# Patient Record
Sex: FEMALE | Race: WHITE
[De-identification: names, ages, dates, MRNs, and addresses within clinical notes are randomized per-mention and may not be internally consistent; named-entity substitution may affect disease eponyms.]

---

## 2017-08-03 ENCOUNTER — HISTORIC RESULTS (OUTPATIENT)
Dept: ADMINISTRATIVE | Age: 27
End: 2017-08-03

## 2018-11-06 ENCOUNTER — HOSPITAL ENCOUNTER (EMERGENCY)
Facility: CLINIC | Age: 28
Discharge: HOME | End: 2018-11-07
Attending: FAMILY MEDICINE | Admitting: FAMILY MEDICINE
Payer: COMMERCIAL

## 2018-11-06 DIAGNOSIS — R00.2 PALPITATIONS: ICD-10-CM

## 2018-11-06 PROCEDURE — 25000128 H RX IP 250 OP 636: Performed by: FAMILY MEDICINE

## 2018-11-06 PROCEDURE — 84484 ASSAY OF TROPONIN QUANT: CPT | Performed by: FAMILY MEDICINE

## 2018-11-06 PROCEDURE — 85025 COMPLETE CBC W/AUTO DIFF WBC: CPT | Performed by: FAMILY MEDICINE

## 2018-11-06 PROCEDURE — 96360 HYDRATION IV INFUSION INIT: CPT | Performed by: FAMILY MEDICINE

## 2018-11-06 PROCEDURE — 93005 ELECTROCARDIOGRAM TRACING: CPT | Performed by: FAMILY MEDICINE

## 2018-11-06 PROCEDURE — 99284 EMERGENCY DEPT VISIT MOD MDM: CPT | Mod: 25 | Performed by: FAMILY MEDICINE

## 2018-11-06 PROCEDURE — 80053 COMPREHEN METABOLIC PANEL: CPT | Performed by: FAMILY MEDICINE

## 2018-11-06 PROCEDURE — 84703 CHORIONIC GONADOTROPIN ASSAY: CPT | Performed by: FAMILY MEDICINE

## 2018-11-06 PROCEDURE — 93010 ELECTROCARDIOGRAM REPORT: CPT | Mod: Z6 | Performed by: FAMILY MEDICINE

## 2018-11-06 PROCEDURE — 84443 ASSAY THYROID STIM HORMONE: CPT | Performed by: FAMILY MEDICINE

## 2018-11-06 PROCEDURE — 84439 ASSAY OF FREE THYROXINE: CPT | Performed by: FAMILY MEDICINE

## 2018-11-06 RX ORDER — SODIUM CHLORIDE, SODIUM LACTATE, POTASSIUM CHLORIDE, CALCIUM CHLORIDE 600; 310; 30; 20 MG/100ML; MG/100ML; MG/100ML; MG/100ML
1000 INJECTION, SOLUTION INTRAVENOUS CONTINUOUS
Status: DISCONTINUED | OUTPATIENT
Start: 2018-11-06 | End: 2018-11-07 | Stop reason: HOSPADM

## 2018-11-06 RX ADMIN — SODIUM CHLORIDE, POTASSIUM CHLORIDE, SODIUM LACTATE AND CALCIUM CHLORIDE 1000 ML: 600; 310; 30; 20 INJECTION, SOLUTION INTRAVENOUS at 23:54

## 2018-11-06 NOTE — ED AVS SNAPSHOT
[facility] Emergency Department    [address] [address]    Phone:  [phone]    Fax:  [phone]                                       Paulina Villatoro   MRN: [id number]    Department:  [facility] Emergency Department   Date of Visit:  11/6/2018           After Visit Summary Signature Page     I have received my discharge instructions, and my questions have been answered. I have discussed any challenges I see with this plan with the nurse or doctor.    ..........................................................................................................................................  Patient/Patient Representative Signature      ..........................................................................................................................................  Patient Representative Print Name and Relationship to Patient    ..................................................               ................................................  Date                                   Time    ..........................................................................................................................................  Reviewed by Signature/Title    ...................................................              ..............................................  Date                                               Time          22EPIC Rev 08/18

## 2018-11-06 NOTE — ED AVS SNAPSHOT
[facility] Emergency Department    [address] [facility] [facility] [address]    Phone:  [phone]    Fax:  [phone]                                       Paulina Villatoro   MRN: [id number]    Department:  [facility] Emergency Department   Date of Visit:  11/6/2018           Patient Information     Date Of Birth          1990        Your diagnoses for this visit were:     Palpitations        You were seen by Mitch Barragan MD.      Follow-up Information     Call Luis Bedolla MD.    Specialty:  Family Practice    Why:  As needed, If symptoms worsen    Contact information:    [facility]  [address] [address]  [phone]          Discharge Instructions         Understanding Heart Palpitations    Heart palpitations are a symptom. It s the feeling you have when your heartbeat seems to be racing, pounding, skipping, or fluttering. Heart palpitations are most often felt in the chest. Sometimes, they may also be felt in the neck.  What causes heart palpitations?  In most cases, heart palpitations are caused by:    Stress or anxiety    Exercise    Pregnancy    Some medicines    Caffeine    Nicotine    Alcohol    Illegal drugs, such as cocaine    Health problems, such as anemia or overactive thyroid  In some cases, heart palpitations may be caused by a problem with the heart. Abnormal heart rhythms (arrhythmias) are the main concern. They may need to be managed by you and your healthcare provider or treated right away.  How are heart palpitations treated?  Treatments for heart palpitations depend on the cause. Options may include:    Managing the things that trigger your heart palpitations. This could mean:  ? Learning ways to reduce stress and anxiety  ? Avoiding caffeine, nicotine, alcohol, or illegal drugs  ? Stopping the use of certain medicines, under your doctor s guidance    Medicines, procedures, or surgery to treat an arrhythmia or other health  "problem that is causing your symptoms  What are the complications of heart palpitations?  Complications of heart palpitations are rare unless they are caused by a problem such as an arrhythmia. In such cases, complications can include:    Fainting    Heart failure. This problem occurs when the heart is so weak it no longer pumps blood well.    Blood clots and stroke    Sudden cardiac arrest. This problem occurs when the heart suddenly stops beating.  When should I call my healthcare provider?  Call your healthcare provider right away if you have any of these:    Fever of 100.4 F (38 C) or higher, or as directed    Symptoms that don t get better with treatment, or symptoms that get worse    New symptoms, such as chest pain, shortness of breath, dizziness, or fainting   Date Last Reviewed: 5/1/2016 2000-2018 [employer]. [address] [place] [address]. All rights reserved. This information is not intended as a substitute for professional medical care. Always follow your healthcare professional's instructions.          Heart Palpitations    Palpitations are the feeling that your heart is beating hard, fast, or irregular. Some describe it as \"pounding\" or \"skipped beats.\" Palpitations may occur in someone with heart disease, but can also occur in a healthy person.  Heart-related causes:    Arrhythmia (a change from the heart's normal rhythm)    Heart valve disease    Disease of the heart muscle    Coronary artery disease    High blood pressure  Non-heart-related causes:    Certain medicines such as asthma inhalers and decongestants    Some herbal supplements, energy drinks and pills, and weight loss pills    Illegal stimulant drugs such as cocaine, crank, methamphetamine, PCP, bath salts, or ecstasy    Caffeine, alcohol, and tobacco    Medical conditions such as thyroid disease, anemia, anxiety, and panic disorder  Sometimes the cause can't be found.  Home care  Follow these home care " tips:    Don't use too much caffeine, alcohol, tobacco, or any stimulant drugs.    Tell your doctor about any prescription or over-the-counter or herbal medicines you take.  Follow-up care    Follow up with your doctor, or as advised.  Call 911  This is the fastest and safest way to get to the emergency department. The paramedics can also begin treatment on the way to the hospital, if needed.  Don't wait until your symptoms are severe to call 911. These are reasons to call 911:    Chest pain    Shortness of breath    Feeling lightheaded, faint, or dizzy    Fainting or loss of consciousness    Very irregular heartbeat    Rapid heartbeat that makes you uncomfortable    Slower than usual heart rate associated with symptoms    Slower than usual heart rate    Chest pain with weakness, dizziness, heavy sweating, nausea, or vomiting    Extreme drowsiness or confusion    Weakness of an arm or leg, or on 1 side of the face    Difficulty with speech or vision  When to seek medical advice  Call your healthcare provider right away if you have palpitations and any of the following:    Weakness    Dizziness    Lightheadedness    Fainting  Date Last Reviewed: 4/27/2016 2000-2018 [employer]. [address] [place] [address]. All rights reserved. This information is not intended as a substitute for professional medical care. Always follow your healthcare professional's instructions.      Discontinue the hydroxyzine.  Continue other medications as previously directed.  If not improving or worse please return to the emergency department or follow-up in clinic with primary care provider.    24 Hour Appointment Hotline       To make an appointment at any [facility], call [phone] ([phone]). If you don't have a family doctor or clinic, we will help you find one. [facility] clinics are conveniently located to serve the needs of you and your family.             Review of your medicines      Our  records show that you are taking the medicines listed below. If these are incorrect, please call your family doctor or clinic.        Dose / Directions Last dose taken    albuterol 108 (90 Base) MCG/ACT inhaler   Commonly known as:  PROAIR HFA/PROVENTIL HFA/VENTOLIN HFA   Dose:  2 puff        Inhale 2 puffs into the lungs every 6 hours.   Refills:  0        ibuprofen 200 MG tablet   Commonly known as:  ADVIL/MOTRIN   Quantity:  140        TAKE 1 TO 2 TABLETS EVERY 4 TO 6 HOURS AS NEEDED WITH FOOD   Refills:  0        TYLENOL 500 MG tablet   Quantity:  60   Generic drug:  acetaminophen        ONE TABLET EVERY 4 TO 6 HOURS AS NEEDED FOR PAIN, MAXIMUM OF 8 PER DAY   Refills:  0        ZOLOFT PO   Dose:  100 mg        Take 100 mg by mouth daily   Refills:  0                Procedures and tests performed during your visit     Procedure/Test Number of Times Performed    CBC with platelets differential 1    Comprehensive metabolic panel 1    EKG 12 lead 1    HCG qualitative Blood 1    T4 free 2    TSH with free T4 reflex 1    Troponin I 1      Orders Needing Specimen Collection     None      Pending Results     Date and Time Order Name Status Description    11/6/2018 2353 T4 free In process             Pending Culture Results     No orders found for last 3 day(s).            Pending Results Instructions     If you had any lab results that were not finalized at the time of your Discharge, you can call the ED Lab Result RN at [phone]. You will be contacted by this team for any positive Lab results or changes in treatment. The nurses are available 7 days a week from 10A to 6:30P.  You can leave a message 24 hours per day and they will return your call.        Test Results From Your Hospital Stay        11/7/2018 12:14 AM      Component Results     Component Value Ref Range & Units Status    WBC 6.3 4.0 - 11.0 10e9/L Final    RBC Count 4.12 3.8 - 5.2 10e12/L Final    Hemoglobin 12.6 11.7 - 15.7 g/dL Final    Hematocrit  38.5 35.0 - 47.0 % Final    MCV 93 78 - 100 fl Final    MCH 30.6 26.5 - 33.0 pg Final    MCHC 32.7 31.5 - 36.5 g/dL Final    RDW 13.0 10.0 - 15.0 % Final    Platelet Count 204 150 - 450 10e9/L Final    Diff Method Automated Method  Final    % Neutrophils 45.4 % Final    % Lymphocytes 44.6 % Final    % Monocytes 7.1 % Final    % Eosinophils 2.2 % Final    % Basophils 0.5 % Final    % Immature Granulocytes 0.2 % Final    Nucleated RBCs 0 0 /100 Final    Absolute Neutrophil 2.9 1.6 - 8.3 10e9/L Final    Absolute Lymphocytes 2.8 0.8 - 5.3 10e9/L Final    Absolute Monocytes 0.5 0.0 - 1.3 10e9/L Final    Absolute Eosinophils 0.1 0.0 - 0.7 10e9/L Final    Absolute Basophils 0.0 0.0 - 0.2 10e9/L Final    Abs Immature Granulocytes 0.0 0 - 0.4 10e9/L Final    Absolute Nucleated RBC 0.0  Final         11/7/2018 12:52 AM      Component Results     Component Value Ref Range & Units Status    Sodium 142 133 - 144 mmol/L Final    Potassium 3.8 3.4 - 5.3 mmol/L Final    Chloride 108 94 - 109 mmol/L Final    Carbon Dioxide 26 20 - 32 mmol/L Final    Anion Gap 8 3 - 14 mmol/L Final    Glucose 85 70 - 99 mg/dL Final    Urea Nitrogen 13 7 - 30 mg/dL Final    Creatinine 0.86 0.52 - 1.04 mg/dL Final    GFR Estimate 77 >60 mL/min/1.7m2 Final    Non  GFR Calc    GFR Estimate If Black >90 >60 mL/min/1.7m2 Final    African American GFR Calc    Calcium 7.9 (L) 8.5 - 10.1 mg/dL Final    Bilirubin Total 0.3 0.2 - 1.3 mg/dL Final    Albumin 3.4 3.4 - 5.0 g/dL Final    Protein Total 6.5 (L) 6.8 - 8.8 g/dL Final    Alkaline Phosphatase 52 40 - 150 U/L Final    ALT 17 0 - 50 U/L Final    AST 13 0 - 45 U/L Final         11/7/2018 12:52 AM      Component Results     Component Value Ref Range & Units Status    Troponin I ES <0.015 0.000 - 0.045 ug/L Final    The 99th percentile for upper reference range is 0.045 ug/L.  Troponin values   in the range of 0.045 - 0.120 ug/L may be associated with risks of adverse   clinical events.       "     2018  1:04 AM      Component Results     Component Value Ref Range & Units Status    HCG Qualitative Serum Negative NEG^Negative Final    This test is for screening purposes.  Results should be interpreted along with   the clinical picture.  Confirmation testing is available if warranted by   ordering IGM560, HCG Quantitative Pregnancy.           2018  1:13 AM      Component Results     Component Value Ref Range & Units Status    TSH 11.01 (H) 0.40 - 4.00 mU/L Final         2018  1:00 AM         2018  1:13 AM      Component Results     Component Value Ref Range & Units Status    T4 Free 0.82 0.76 - 1.46 ng/dL Final                Thank you for choosing [facility]       Thank you for choosing [facility] for your care. Our goal is always to provide you with excellent care. Hearing back from our patients is one way we can continue to improve our services. Please take a few minutes to complete the written survey that you may receive in the mail after you visit with us. Thank you!        [employer] Information     [employer] lets you send messages to your doctor, view your test results, renew your prescriptions, schedule appointments and more. To sign up, go to [email or web address] . Click on \"Log in\" on the left side of the screen, which will take you to the Welcome page. Then click on \"Sign up Now\" on the right side of the page.     You will be asked to enter the access code listed below, as well as some personal information. Please follow the directions to create your username and password.     Your access code is: [email or web address]  Expires: 2019  1:57 AM     Your access code will  in 90 days. If you need help or a new code, please call your [facility] clinic or [phone].        Care EveryWhere ID     This is your Care EveryWhere ID. This could be used by other organizations to access your [facility] medical records  [id number]        Equal Access to Services     TANIKA SILVA: Latesha de guzman " fred Garcia, shameka jordan, leeroy cole, britt bridges. So [facility] [phone].    ATENCIÓN: Si habla español, tiene a mills disposición servicios gratuitos de asistencia lingüística. Llame al [phone].    We comply with applicable federal civil rights laws and [place] laws. We do not discriminate on the basis of race, color, national origin, age, disability, sex, sexual orientation, or gender identity.            After Visit Summary       This is your record. Keep this with you and show to your community pharmacist(s) and doctor(s) at your next visit.

## 2018-11-07 VITALS
HEART RATE: 72 BPM | WEIGHT: 145 LBS | OXYGEN SATURATION: 98 % | BODY MASS INDEX: 20.76 KG/M2 | SYSTOLIC BLOOD PRESSURE: 104 MMHG | RESPIRATION RATE: 18 BRPM | DIASTOLIC BLOOD PRESSURE: 66 MMHG | HEIGHT: 70 IN

## 2018-11-07 LAB
ALBUMIN SERPL-MCNC: 3.4 G/DL (ref 3.4–5)
ALP SERPL-CCNC: 52 U/L (ref 40–150)
ALT SERPL W P-5'-P-CCNC: 17 U/L (ref 0–50)
ANION GAP SERPL CALCULATED.3IONS-SCNC: 8 MMOL/L (ref 3–14)
AST SERPL W P-5'-P-CCNC: 13 U/L (ref 0–45)
BASOPHILS # BLD AUTO: 0 10E9/L (ref 0–0.2)
BASOPHILS NFR BLD AUTO: 0.5 %
BILIRUB SERPL-MCNC: 0.3 MG/DL (ref 0.2–1.3)
BUN SERPL-MCNC: 13 MG/DL (ref 7–30)
CALCIUM SERPL-MCNC: 7.9 MG/DL (ref 8.5–10.1)
CHLORIDE SERPL-SCNC: 108 MMOL/L (ref 94–109)
CO2 SERPL-SCNC: 26 MMOL/L (ref 20–32)
CREAT SERPL-MCNC: 0.86 MG/DL (ref 0.52–1.04)
DIFFERENTIAL METHOD BLD: NORMAL
EOSINOPHIL # BLD AUTO: 0.1 10E9/L (ref 0–0.7)
EOSINOPHIL NFR BLD AUTO: 2.2 %
ERYTHROCYTE [DISTWIDTH] IN BLOOD BY AUTOMATED COUNT: 13 % (ref 10–15)
GFR SERPL CREATININE-BSD FRML MDRD: 77 ML/MIN/1.7M2
GLUCOSE SERPL-MCNC: 85 MG/DL (ref 70–99)
HCG SERPL QL: NEGATIVE
HCT VFR BLD AUTO: 38.5 % (ref 35–47)
HGB BLD-MCNC: 12.6 G/DL (ref 11.7–15.7)
IMM GRANULOCYTES # BLD: 0 10E9/L (ref 0–0.4)
IMM GRANULOCYTES NFR BLD: 0.2 %
LYMPHOCYTES # BLD AUTO: 2.8 10E9/L (ref 0.8–5.3)
LYMPHOCYTES NFR BLD AUTO: 44.6 %
MCH RBC QN AUTO: 30.6 PG (ref 26.5–33)
MCHC RBC AUTO-ENTMCNC: 32.7 G/DL (ref 31.5–36.5)
MCV RBC AUTO: 93 FL (ref 78–100)
MONOCYTES # BLD AUTO: 0.5 10E9/L (ref 0–1.3)
MONOCYTES NFR BLD AUTO: 7.1 %
NEUTROPHILS # BLD AUTO: 2.9 10E9/L (ref 1.6–8.3)
NEUTROPHILS NFR BLD AUTO: 45.4 %
NRBC # BLD AUTO: 0 10*3/UL
NRBC BLD AUTO-RTO: 0 /100
PLATELET # BLD AUTO: 204 10E9/L (ref 150–450)
POTASSIUM SERPL-SCNC: 3.8 MMOL/L (ref 3.4–5.3)
PROT SERPL-MCNC: 6.5 G/DL (ref 6.8–8.8)
RBC # BLD AUTO: 4.12 10E12/L (ref 3.8–5.2)
SODIUM SERPL-SCNC: 142 MMOL/L (ref 133–144)
T4 FREE SERPL-MCNC: 0.82 NG/DL (ref 0.76–1.46)
TROPONIN I SERPL-MCNC: <0.015 UG/L (ref 0–0.04)
TSH SERPL DL<=0.005 MIU/L-ACNC: 11.01 MU/L (ref 0.4–4)
WBC # BLD AUTO: 6.3 10E9/L (ref 4–11)

## 2018-11-07 PROCEDURE — 25000128 H RX IP 250 OP 636: Performed by: FAMILY MEDICINE

## 2018-11-07 PROCEDURE — 96361 HYDRATE IV INFUSION ADD-ON: CPT | Performed by: FAMILY MEDICINE

## 2018-11-07 RX ADMIN — SODIUM CHLORIDE, POTASSIUM CHLORIDE, SODIUM LACTATE AND CALCIUM CHLORIDE 1000 ML: 600; 310; 30; 20 INJECTION, SOLUTION INTRAVENOUS at 00:33

## 2018-11-07 NOTE — DISCHARGE INSTRUCTIONS
Understanding Heart Palpitations    Heart palpitations are a symptom. It s the feeling you have when your heartbeat seems to be racing, pounding, skipping, or fluttering. Heart palpitations are most often felt in the chest. Sometimes, they may also be felt in the neck.  What causes heart palpitations?  In most cases, heart palpitations are caused by:    Stress or anxiety    Exercise    Pregnancy    Some medicines    Caffeine    Nicotine    Alcohol    Illegal drugs, such as cocaine    Health problems, such as anemia or overactive thyroid  In some cases, heart palpitations may be caused by a problem with the heart. Abnormal heart rhythms (arrhythmias) are the main concern. They may need to be managed by you and your healthcare provider or treated right away.  How are heart palpitations treated?  Treatments for heart palpitations depend on the cause. Options may include:    Managing the things that trigger your heart palpitations. This could mean:  ? Learning ways to reduce stress and anxiety  ? Avoiding caffeine, nicotine, alcohol, or illegal drugs  ? Stopping the use of certain medicines, under your doctor s guidance    Medicines, procedures, or surgery to treat an arrhythmia or other health problem that is causing your symptoms  What are the complications of heart palpitations?  Complications of heart palpitations are rare unless they are caused by a problem such as an arrhythmia. In such cases, complications can include:    Fainting    Heart failure. This problem occurs when the heart is so weak it no longer pumps blood well.    Blood clots and stroke    Sudden cardiac arrest. This problem occurs when the heart suddenly stops beating.  When should I call my healthcare provider?  Call your healthcare provider right away if you have any of these:    Fever of 100.4 F (38 C) or higher, or as directed    Symptoms that don t get better with treatment, or symptoms that get worse    New symptoms, such as chest pain,  "shortness of breath, dizziness, or fainting   Date Last Reviewed: 5/1/2016 2000-2018 [employer]. [address] [place] [address], [place] [address]. All rights reserved. This information is not intended as a substitute for professional medical care. Always follow your healthcare professional's instructions.          Heart Palpitations    Palpitations are the feeling that your heart is beating hard, fast, or irregular. Some describe it as \"pounding\" or \"skipped beats.\" Palpitations may occur in someone with heart disease, but can also occur in a healthy person.  Heart-related causes:    Arrhythmia (a change from the heart's normal rhythm)    Heart valve disease    Disease of the heart muscle    Coronary artery disease    High blood pressure  Non-heart-related causes:    Certain medicines such as asthma inhalers and decongestants    Some herbal supplements, energy drinks and pills, and weight loss pills    Illegal stimulant drugs such as cocaine, crank, methamphetamine, PCP, bath salts, or ecstasy    Caffeine, alcohol, and tobacco    Medical conditions such as thyroid disease, anemia, anxiety, and panic disorder  Sometimes the cause can't be found.  Home care  Follow these home care tips:    Don't use too much caffeine, alcohol, tobacco, or any stimulant drugs.    Tell your doctor about any prescription or over-the-counter or herbal medicines you take.  Follow-up care    Follow up with your doctor, or as advised.  Call 911  This is the fastest and safest way to get to the emergency department. The paramedics can also begin treatment on the way to the hospital, if needed.  Don't wait until your symptoms are severe to call 911. These are reasons to call 911:    Chest pain    Shortness of breath    Feeling lightheaded, faint, or dizzy    Fainting or loss of consciousness    Very irregular heartbeat    Rapid heartbeat that makes you uncomfortable    Slower than usual heart rate associated with symptoms    Slower " than usual heart rate    Chest pain with weakness, dizziness, heavy sweating, nausea, or vomiting    Extreme drowsiness or confusion    Weakness of an arm or leg, or on 1 side of the face    Difficulty with speech or vision  When to seek medical advice  Call your healthcare provider right away if you have palpitations and any of the following:    Weakness    Dizziness    Lightheadedness    Fainting  Date Last Reviewed: 4/27/2016 2000-2018 [employer]. [address] [place] [address], [place] [address]. All rights reserved. This information is not intended as a substitute for professional medical care. Always follow your healthcare professional's instructions.      Discontinue the hydroxyzine.  Continue other medications as previously directed.  If not improving or worse please return to the emergency department or follow-up in clinic with primary care provider.

## 2018-11-07 NOTE — ED PROVIDER NOTES
History     Chief Complaint   Patient presents with     Irregular Heart Beat     feels like having heart skip beats    palpitations  HPI  Paulina Villatoro is a 27 year old female, past medical history significant for mild asthma, chronic low back pain, anxiety, depression, irritable bowel syndrome, adjustment disorder with anxiety, presents to the emergency department concerns of palpitations/irregular heartbeat beginning approximately 24-36 hours prior to presentation.  Does not correlate with exercise.  Intermittent left-sided chest pain of at least 4 months duration previously diagnosed Tietze's syndrome not present currently.  No associated shortness of breath lightheadedness nausea or vomiting.  No fever chills or sweats.  No recent URI type symptoms.  No recent change in caffeine intake which is usually 2 cups/day and never more.  Non-smoker.  The patient notes that she has been on sertraline for a long time but had begun taking hydroxyzine at bedtime to help her sleep over the last 1/2 weeks and is concerned that there may be an interaction there that is giving her some irregular heartbeat.  The patient made an appointment with her primary care provider for complete physical for tomorrow.        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        Anxiety disorder, unspecified 05/20/2016     Priority: Medium     Mild recurrent major depression (H) 05/20/2016     Priority: Medium     IBS (irritable bowel syndrome) 08/30/2013     Priority: Medium     Adjustment disorder with anxiety 07/20/2012     Priority: Medium     Overview:   Zoloft 50mg, restarted for anxiety  "5/2015 after haven been off on her own for a while. Dose decreased to 25mg 2/2016 as she as feeling apathetic, she will also try a light box.    Seems to have SAD in the late winter/spring       CARDIOVASCULAR SCREENING; LDL GOAL LESS THAN 160 10/31/2010     Priority: Medium     LUMBAR DISC HERNIATION L5-S1 02/05/2007     Priority: Medium        Past Medical History:    No past medical history on file.    Past Surgical History:    No past surgical history on file.    Family History:    Family History   Problem Relation Age of Onset     Diabetes Paternal Grandmother        Social History:  Marital Status:  Single [1]  Social History   Substance Use Topics     Smoking status: Never Smoker     Smokeless tobacco: Not on file     Alcohol use No        Medications:      albuterol (PROVENTIL HFA: VENTOLIN HFA) 108 (90 BASE) MCG/ACT inhaler   IBUPROFEN 200 MG OR TABS   Sertraline HCl (ZOLOFT PO)   TYLENOL EXTRA STRENGTH 500 MG OR TABS         Review of Systems   All other systems reviewed and are negative.      Physical Exam   BP: 110/73  Pulse: 72  Heart Rate: 71  Resp: 18  Height: 177.8 cm (5' 10\")  Weight: 65.8 kg (145 lb)  SpO2: 98 %      Physical Exam   Constitutional: She is oriented to person, place, and time. She appears well-developed and well-nourished.   HENT:   Head: Normocephalic and atraumatic.   Right Ear: External ear normal.   Left Ear: External ear normal.   Nose: Nose normal.   Mouth/Throat: Oropharynx is clear and moist.   Eyes: Conjunctivae and EOM are normal. Pupils are equal, round, and reactive to light.   Neck: Normal range of motion. Neck supple.   Cardiovascular: Normal rate, regular rhythm, normal heart sounds and intact distal pulses.    Pulmonary/Chest: Effort normal and breath sounds normal.   Abdominal: Soft. Bowel sounds are normal.   Musculoskeletal: Normal range of motion.   Neurological: She is alert and oriented to person, place, and time.   Skin: Skin is warm and dry.   Psychiatric: She " has a normal mood and affect. Her behavior is normal.   Nursing note and vitals reviewed.      ED Course     ED Course     Procedures               EKG Interpretation:      Interpreted by Mitch Barragan  Time reviewed: 23:32  Symptoms at time of EKG: None   Rhythm: normal sinus   Rate: 69  Axis: Normal  Ectopy: none  Conduction: normal  ST Segments/ T Waves: No ST-T wave changes and No acute ischemic changes  Q Waves: none  Comparison to prior: No old EKG available    Clinical Impression: normal EKG      Labs Ordered and Resulted from Time of ED Arrival Up to the Time of Departure from the ED   COMPREHENSIVE METABOLIC PANEL - Abnormal; Notable for the following:        Result Value    Calcium 7.9 (*)     Protein Total 6.5 (*)     All other components within normal limits   TSH WITH FREE T4 REFLEX - Abnormal; Notable for the following:     TSH 11.01 (*)     All other components within normal limits   CBC WITH PLATELETS DIFFERENTIAL   TROPONIN I   HCG QUALITATIVE   T4 FREE   T4 FREE               Critical Care time:  none               No results found for this or any previous visit (from the past 24 hour(s)).    Medications   lactated ringers BOLUS 1,000 mL (0 mLs Intravenous Stopped 11/7/18 0029)       Assessments & Plan (with Medical Decision Making)   27-year-old female past medical history significant as reviewed above who presents to the emergency department with concerns of palpitations/irregular heartbeat of approximately 24-36 hours of sporadic occurrence as discussed in the HPI.  Associated symptoms as discussed.  The patient is asymptomatic at presentation with stable normal adult range vital signs.  Unremarkable EKG.  Lab diagnostics reviewed as above showing a slightly low calcium of 7.9, TSH was elevated at 11.1 however the teeth for free is normal.  Normal CBC normal cardiac troponin, non-pregnant.  We reviewed the differential diagnostic considerations for palpitations or irregular heartbeat.   The patient has been combining SSRI with hydroxyzine which has significant potential for anticholinergic effect.  She has been using the hydroxyzine for sleep.  I have discouraged this practice I would like her to discontinue completely.  This may resolve her concerns if it does not I would have her follow-up further in clinic for discussion of potentially an event monitor or 24-48-hour Holter to further identify any underlying rhythm disturbance of significance.  If this is not medication related in a patient of this age and health I would suspect a benign etiology such as PVCs or greater appreciation of them.  The patient was reassured and disposition to home with instructions to discontinue the Atarax.    Disclaimer: This note consists of symbols derived from keyboarding, dictation and/or voice recognition software. As a result, there may be errors in the script that have gone undetected. Please consider this when interpreting information found in this chart.      I have reviewed the nursing notes.    I have reviewed the findings, diagnosis, plan and need for follow up with the patient.       Discharge Medication List as of 11/7/2018  1:59 AM          Final diagnoses:   Palpitations       11/6/2018   [facility] EMERGENCY DEPARTMENT     Mitch Barragan MD  11/09/18 9606

## 2018-11-11 ENCOUNTER — HEALTH MAINTENANCE LETTER (OUTPATIENT)
Age: 28
End: 2018-11-11

## 2019-02-25 ENCOUNTER — OFFICE VISIT (OUTPATIENT)
Dept: LAB | Facility: SCHOOL | Age: 29
End: 2019-02-25
Payer: COMMERCIAL

## 2019-02-25 ENCOUNTER — NURSE TRIAGE (OUTPATIENT)
Dept: NURSING | Facility: CLINIC | Age: 29
End: 2019-02-25

## 2019-02-25 VITALS
SYSTOLIC BLOOD PRESSURE: 110 MMHG | HEART RATE: 82 BPM | WEIGHT: 150 LBS | HEIGHT: 70 IN | OXYGEN SATURATION: 99 % | TEMPERATURE: 98.1 F | DIASTOLIC BLOOD PRESSURE: 74 MMHG | BODY MASS INDEX: 21.47 KG/M2

## 2019-02-25 DIAGNOSIS — J01.90 ACUTE SINUSITIS WITH SYMPTOMS > 10 DAYS: Primary | ICD-10-CM

## 2019-02-25 PROCEDURE — 99203 OFFICE O/P NEW LOW 30 MIN: CPT

## 2019-02-25 RX ORDER — CITALOPRAM HYDROBROMIDE 20 MG/1
30 TABLET ORAL DAILY
COMMUNITY
Start: 2018-04-19

## 2019-02-25 RX ORDER — AMOXICILLIN AND CLAVULANATE POTASSIUM 600; 42.9 MG/5ML; MG/5ML
875 POWDER, FOR SUSPENSION ORAL 2 TIMES DAILY
Qty: 146 ML | Refills: 0 | Status: SHIPPED | OUTPATIENT
Start: 2019-02-25 | End: 2019-08-24

## 2019-02-25 ASSESSMENT — MIFFLIN-ST. JEOR: SCORE: 1480.65

## 2019-02-25 NOTE — PATIENT INSTRUCTIONS
Patient Education   Thank you for using the [facility].  If there is no improvement of your condition, please call and schedule an appointment with your primary care provider.    The medication (s), dosing, route and duration was discussed with the patient.  In addition the drug monograph was reviewed and given to the patient for the medication (s).    Sinusitis (Antibiotic Treatment)    The sinuses are air-filled spaces within the bones of the face. They connect to the inside of the nose. Sinusitis is an inflammation of the tissue that lines the sinuses. Sinusitis can occur during a cold. It can also happen due to allergies to pollens and other particles in the air. Sinusitis can cause symptoms of sinus congestion and a feeling of fullness. A sinus infection causes fever, headache, and facial pain. There is often green or yellow fluid draining from the nose or into the back of the throat (post-nasal drip). You have been given antibiotics to treat this condition.  Home care    Take the full course of antibiotics as instructed. Do not stop taking them, even when you feel better.    Drink plenty of water, hot tea, and other liquids. This may help thin nasal mucus. It also may help your sinuses drain fluids.    Heat may help soothe painful areas of your face. Use a towel soaked in hot water. Or,  the shower and direct the warm spray onto your face. Using a vaporizer along with a menthol rub at night may also help soothe symptoms.     An expectorant with guaifenesin may help thin nasal mucus and help your sinuses drain fluids.    You can use an over-the-counter decongestant, unless a similar medicine was prescribed to you. Nasal sprays work the fastest. Use one that contains phenylephrine or oxymetazoline. First blow your nose gently. Then use the spray. Do not use these medicines more often than directed on the label. If you do, your symptoms may get worse. You may also take pills  that contain pseudoephedrine. Don t use products that combine multiple medicines. This is because side effects may be increased. Read labels. You can also ask the pharmacist for help. (People with high blood pressure should not use decongestants. They can raise blood pressure.)    Over-the-counter antihistamines may help if allergies contributed to your sinusitis.      Do not use nasal rinses or irrigation during an acute sinus infection, unless your healthcare provider tells you to. Rinsing may spread the infection to other areas in your sinuses.    Use acetaminophen or ibuprofen to control pain, unless another pain medicine was prescribed to you. If you have chronic liver or kidney disease or ever had a stomach ulcer, talk with your healthcare provider before using these medicines. (Aspirin should never be taken by anyone under age 18 who is ill with a fever. It may cause severe liver damage.)    Don't smoke. This can make symptoms worse.  Follow-up care  Follow up with your healthcare provider or our staff if you are better in 1 week.  When to seek medical advice  Call your healthcare provider if any of these occur:    Facial pain or headache that gets worse    Stiff neck    Unusual drowsiness or confusion    Swelling of your forehead or eyelids    Vision problems, such as blurred or double vision    Fever of 100.4 F (38 C) or higher, or as directed by your healthcare provider    Seizure    Breathing problems    Symptoms don't go away in 10 days  Prevention  Here are steps you can take to help prevent an infection:    Keep good hand washing habits.    Don t have close contact with people who have sore throats, colds, or other upper respiratory infections.    Don t smoke, and stay away from secondhand smoke.    Stay up to date with of your vaccines.  Date Last Reviewed: 11/1/2017 2000-2018 [employer]. [address] [place] [address], [place] [address]. All rights reserved. This information is not intended  as a substitute for professional medical care. Always follow your healthcare professional's instructions.

## 2019-02-25 NOTE — PROGRESS NOTES
SUBJECTIVE:   Paulina Villatoro is a 28 year old female who presents to clinic today for the following health issues:      ENT Symptoms             Symptoms: cc Present Absent Comment   Fever/Chills  x  chills   Fatigue  x     Muscle Aches  x     Eye Irritation   x    Sneezing   x    Nasal Zhou/Drg  x     Sinus Pressure/Pain  x  Purulent x 4 days   Loss of smell  x     Dental pain  x  X 4 days   Sore Throat  x  Pain, no swelling   Swollen Glands  x     Ear Pain/Fullness  x  pain   Cough  x  Non-productive, feels like its from drainage   Wheeze  x  Last night   Chest Pain  x  With coughing   Shortness of breath   x    Rash   x    Other         Symptom duration:  6 days-started with body aches and sore throat, evolved to cold symptoms later.   Symptom severity:  4/10   Treatments tried:  ibuprofen. [place]. Hot tea and honey.   Contacts:  daughter had similar symptoms       Problem list and histories reviewed & adjusted, as indicated.  Additional history: as documented    Patient Active Problem List   Diagnosis     LUMBAR DISC HERNIATION L5-S1     CARDIOVASCULAR SCREENING; LDL GOAL LESS THAN 160     Adjustment disorder with anxiety     Anxiety disorder, unspecified     Chronic low back pain     IBS (irritable bowel syndrome)     Mild intermittent asthma without complication     Mild recurrent major depression (H)     History reviewed. No pertinent surgical history.    Social History     Tobacco Use     Smoking status: Never Smoker     Smokeless tobacco: Never Used   Substance Use Topics     Alcohol use: No     Family History   Problem Relation Age of Onset     Diabetes Paternal Grandmother            Reviewed and updated as needed this visit by clinical staff       Reviewed and updated as needed this visit by Provider         ROS:  Constitutional, HEENT, cardiovascular, pulmonary, gi and gu systems are negative, except as otherwise noted.    OBJECTIVE:     /74   Pulse 82   Temp 98.1  F (36.7  C) (Tympanic)  "  Ht 1.778 m (5' 10\")   Wt 68 kg (150 lb)   SpO2 99%   BMI 21.52 kg/m    Body mass index is 21.52 kg/m .  GENERAL: alert, no distress and fatigued  EYES: Eyes grossly normal to inspection, PERRL and conjunctivae and sclerae normal  HENT: normal cephalic/atraumatic, both ears: mucopurulent effusion, nose and mouth without ulcers or lesions, nasal mucosa edematous , rhinorrhea purulent, oropharynx clear, oral mucous membranes moist, tonsillar erythema, no exudate or hypertrophy and sinuses: maxillary tenderness on both sides  NECK: bilateral anterior cervical adenopathy and no asymmetry, masses, or scars  RESP: lungs clear to auscultation - no rales, rhonchi or wheezes  CV: regular rates and rhythm, normal S1 S2, no S3 or S4 and no murmur, click or rub  SKIN: no suspicious lesions or rashes  NEURO: Normal strength and tone, mentation intact and speech normal    Diagnostic Test Results:  none     ASSESSMENT/PLAN:       ICD-10-CM    1. Acute sinusitis with symptoms > 10 days J01.90 amoxicillin-clavulanate (AUGMENTIN-ES) 600-42.9 MG/5ML suspension       FUTURE APPOINTMENTS:       - See PCP in 1 week if not improving, sooner for new or worsening symptoms.     Patient Instructions     Patient Education   Thank you for using the [facility] [address] [facility].  If there is no improvement of your condition, please call and schedule an appointment with your primary care provider.    The medication (s), dosing, route and duration was discussed with the patient.  In addition the drug monograph was reviewed and given to the patient for the medication (s).    Sinusitis (Antibiotic Treatment)    The sinuses are air-filled spaces within the bones of the face. They connect to the inside of the nose. Sinusitis is an inflammation of the tissue that lines the sinuses. Sinusitis can occur during a cold. It can also happen due to allergies to pollens and other particles in the air. Sinusitis can cause symptoms of sinus " congestion and a feeling of fullness. A sinus infection causes fever, headache, and facial pain. There is often green or yellow fluid draining from the nose or into the back of the throat (post-nasal drip). You have been given antibiotics to treat this condition.  Home care    Take the full course of antibiotics as instructed. Do not stop taking them, even when you feel better.    Drink plenty of water, hot tea, and other liquids. This may help thin nasal mucus. It also may help your sinuses drain fluids.    Heat may help soothe painful areas of your face. Use a towel soaked in hot water. Or,  the shower and direct the warm spray onto your face. Using a vaporizer along with a menthol rub at night may also help soothe symptoms.     An expectorant with guaifenesin may help thin nasal mucus and help your sinuses drain fluids.    You can use an over-the-counter decongestant, unless a similar medicine was prescribed to you. Nasal sprays work the fastest. Use one that contains phenylephrine or oxymetazoline. First blow your nose gently. Then use the spray. Do not use these medicines more often than directed on the label. If you do, your symptoms may get worse. You may also take pills that contain pseudoephedrine. Don t use products that combine multiple medicines. This is because side effects may be increased. Read labels. You can also ask the pharmacist for help. (People with high blood pressure should not use decongestants. They can raise blood pressure.)    Over-the-counter antihistamines may help if allergies contributed to your sinusitis.      Do not use nasal rinses or irrigation during an acute sinus infection, unless your healthcare provider tells you to. Rinsing may spread the infection to other areas in your sinuses.    Use acetaminophen or ibuprofen to control pain, unless another pain medicine was prescribed to you. If you have chronic liver or kidney disease or ever had a stomach ulcer, talk with your  healthcare provider before using these medicines. (Aspirin should never be taken by anyone under age 18 who is ill with a fever. It may cause severe liver damage.)    Don't smoke. This can make symptoms worse.  Follow-up care  Follow up with your healthcare provider or our staff if you are better in 1 week.  When to seek medical advice  Call your healthcare provider if any of these occur:    Facial pain or headache that gets worse    Stiff neck    Unusual drowsiness or confusion    Swelling of your forehead or eyelids    Vision problems, such as blurred or double vision    Fever of 100.4 F (38 C) or higher, or as directed by your healthcare provider    Seizure    Breathing problems    Symptoms don't go away in 10 days  Prevention  Here are steps you can take to help prevent an infection:    Keep good hand washing habits.    Don t have close contact with people who have sore throats, colds, or other upper respiratory infections.    Don t smoke, and stay away from secondhand smoke.    Stay up to date with of your vaccines.  Date Last Reviewed: 11/1/2017 2000-2018 [employer]. [address] [place], [place] [id number]. All rights reserved. This information is not intended as a substitute for professional medical care. Always follow your healthcare professional's instructions.             FRANCI Palencia [facility] SCHOOL PROVIDER  [facility]

## 2019-02-25 NOTE — TELEPHONE ENCOUNTER
Patient is wondering if they'd be able to write a prescription for a sinus infection or strep. I advised they can.   Lora Webb RN-[facility]

## 2019-08-24 ENCOUNTER — HOSPITAL ENCOUNTER (EMERGENCY)
Facility: CLINIC | Age: 29
Discharge: HOME | End: 2019-08-24
Attending: FAMILY MEDICINE | Admitting: FAMILY MEDICINE
Payer: COMMERCIAL

## 2019-08-24 VITALS
BODY MASS INDEX: 21.52 KG/M2 | DIASTOLIC BLOOD PRESSURE: 72 MMHG | TEMPERATURE: 99.6 F | SYSTOLIC BLOOD PRESSURE: 117 MMHG | HEART RATE: 76 BPM | RESPIRATION RATE: 16 BRPM | OXYGEN SATURATION: 97 % | HEIGHT: 70 IN

## 2019-08-24 PROCEDURE — G0463 HOSPITAL OUTPT CLINIC VISIT: HCPCS | Performed by: FAMILY MEDICINE

## 2019-08-24 PROCEDURE — 99213 OFFICE O/P EST LOW 20 MIN: CPT | Mod: Z6 | Performed by: FAMILY MEDICINE

## 2019-08-24 ASSESSMENT — ENCOUNTER SYMPTOMS
HEMATOLOGIC/LYMPHATIC NEGATIVE: 1
CARDIOVASCULAR NEGATIVE: 1
EYES NEGATIVE: 1
GASTROINTESTINAL NEGATIVE: 1
RESPIRATORY NEGATIVE: 1
PSYCHIATRIC NEGATIVE: 1
CONSTITUTIONAL NEGATIVE: 1
ENDOCRINE NEGATIVE: 1
MUSCULOSKELETAL NEGATIVE: 1

## 2019-08-24 NOTE — ED PROVIDER NOTES
History     Chief Complaint   Patient presents with     Mouth Lesions     HPI  Paulina Villatoro is a 28 year old female who presents for a lesion on her frenulum.  First noticed about 2 weeks ago appears to be getting bigger.  She reports no pain at present but is starting to obstruct when she eats.  She denies any difficulty with swallowing.  She has not had any systemic symptoms no fevers or chills.  She denies any swelling in her lymph nodes in her neck.  No other acute symptoms reported.    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        Anxiety disorder, unspecified 05/20/2016     Priority: Medium     Mild recurrent major depression (H) 05/20/2016     Priority: Medium     IBS (irritable bowel syndrome) 08/30/2013     Priority: Medium     Adjustment disorder with anxiety 07/20/2012     Priority: Medium     Overview:   Zoloft 50mg, restarted for anxiety 5/2015 after haven been off on her own for a while. Dose decreased to 25mg 2/2016 as she as feeling apathetic, she will also try a light box.    Seems to have SAD in the late winter/spring       CARDIOVASCULAR SCREENING; LDL GOAL LESS THAN 160 10/31/2010     Priority: Medium     LUMBAR DISC HERNIATION L5-S1 02/05/2007     Priority: Medium        Past Medical History:    History reviewed. No pertinent past medical history.    Past Surgical History:    History reviewed. No pertinent surgical history.    Family History:    Family History   Problem Relation Age of Onset     Diabetes Paternal Grandmother        Social History:  Marital Status:  Single [1]  Social History     Tobacco Use  "    Smoking status: Never Smoker     Smokeless tobacco: Never Used   Substance Use Topics     Alcohol use: No     Drug use: No        Medications:      albuterol (PROVENTIL HFA: VENTOLIN HFA) 108 (90 BASE) MCG/ACT inhaler   citalopram (CELEXA) 20 MG tablet   IBUPROFEN 200 MG OR TABS   TYLENOL EXTRA STRENGTH 500 MG OR TABS         Review of Systems   Constitutional: Negative.    HENT: Negative for dental problem.         Mouth lesion   Eyes: Negative.    Respiratory: Negative.    Cardiovascular: Negative.    Gastrointestinal: Negative.    Endocrine: Negative.    Genitourinary: Negative.    Musculoskeletal: Negative.    Hematological: Negative.    Psychiatric/Behavioral: Negative.        Physical Exam   BP: 117/72  Pulse: 76  Temp: 99.6  F (37.6  C)  Resp: 16  Height: 177.8 cm (5' 10\")  SpO2: 97 %      Physical Exam   Constitutional: She appears well-developed and well-nourished.   HENT:   Mouth/Throat: Oral lesions present.       Lesion on frenulum    Eyes: Pupils are equal, round, and reactive to light. EOM are normal.   Cardiovascular: Normal rate, regular rhythm and normal heart sounds.       ED Course        Procedures             No results found for this or any previous visit (from the past 24 hour(s)).    Medications - No data to display    Assessments & Plan (with Medical Decision Making)     I have reviewed the nursing notes.    I have reviewed the findings, diagnosis, plan and need for follow up with the patient.      Discharge Medication List as of 8/24/2019  5:09 PM          Final diagnoses:   Accessory oral frenulum   Patient reassured, recommend ENT referral . Referral placed.    8/24/2019   [facility] EMERGENCY DEPARTMENT     Mandi Lilly MD  08/24/19 1716    "

## 2019-08-24 NOTE — ED AVS SNAPSHOT
[facility] Emergency Department  [address] [address]  Phone:  [phone]  Fax:  [phone]                                    Paulina Villatoro   MRN: [id number]    Department:  [facility] Emergency Department   Date of Visit:  8/24/2019           After Visit Summary Signature Page    I have received my discharge instructions, and my questions have been answered. I have discussed any challenges I see with this plan with the nurse or doctor.    ..........................................................................................................................................  Patient/Patient Representative Signature      ..........................................................................................................................................  Patient Representative Print Name and Relationship to Patient    ..................................................               ................................................  Date                                   Time    ..........................................................................................................................................  Reviewed by Signature/Title    ...................................................              ..............................................  Date                                               Time          22EPIC Rev 08/18

## 2019-08-26 ENCOUNTER — TELEPHONE (OUTPATIENT)
Dept: OTOLARYNGOLOGY | Facility: CLINIC | Age: 29
End: 2019-08-26

## 2019-08-26 NOTE — TELEPHONE ENCOUNTER
Reason for Call:  Other     Detailed comments: Pt was seen in UC on 08/24 for a lesion on her frenulum that is getting bigger - advised to be seen by ENT ASAP to have this lesion removed - would like to be seen today and is willing to travel - Please advise    Phone Number Patient can be reached at: Home number on file [phone] (home)    Best Time: Any    Can we leave a detailed message on this number? YES    Call taken on 8/26/2019 at 8:50 AM by Denise Behrendt

## 2019-08-26 NOTE — TELEPHONE ENCOUNTER
FUTURE VISIT INFORMATION      FUTURE VISIT INFORMATION:    Date: 8/27/19    Time: 10:30AM    Location: [facility]  REFERRAL INFORMATION:    Referring provider:  Mandi Lilly MD    Referring providers clinic:  [facility]     Reason for visit/diagnosis : Accessory oral frenulum     RECORDS REQUESTED FROM:       Clinic name Comments Records Status Imaging Status   [facility]  8/24/19 notes with Dr Lilly EPIC

## 2019-08-26 NOTE — TELEPHONE ENCOUNTER
Called and spoke with pt. Informed her that we do no currently have and ENT provider in clinic. Number given for [facility] and [facility]. Agreeable.     Latonya CALLAHAN   Allergy RN

## 2019-08-27 ENCOUNTER — OFFICE VISIT (OUTPATIENT)
Dept: OTOLARYNGOLOGY | Facility: CLINIC | Age: 29
End: 2019-08-27
Payer: COMMERCIAL

## 2019-08-27 ENCOUNTER — PRE VISIT (OUTPATIENT)
Dept: OTOLARYNGOLOGY | Facility: CLINIC | Age: 29
End: 2019-08-27

## 2019-08-27 VITALS
SYSTOLIC BLOOD PRESSURE: 106 MMHG | RESPIRATION RATE: 15 BRPM | BODY MASS INDEX: 21.62 KG/M2 | HEIGHT: 70 IN | WEIGHT: 151 LBS | HEART RATE: 73 BPM | DIASTOLIC BLOOD PRESSURE: 65 MMHG

## 2019-08-27 RX ORDER — CEPHALEXIN 500 MG/1
500 CAPSULE ORAL 2 TIMES DAILY
Qty: 20 CAPSULE | Refills: 0 | Status: SHIPPED | OUTPATIENT
Start: 2019-08-27 | End: 2019-09-06

## 2019-08-27 ASSESSMENT — MIFFLIN-ST. JEOR: SCORE: 1485.18

## 2019-08-27 ASSESSMENT — PAIN SCALES - GENERAL: PAINLEVEL: NO PAIN (1)

## 2019-08-27 NOTE — NURSING NOTE
"Chief Complaint   Patient presents with     Consult     Accessory oral frenulum      /65 (BP Location: Right arm, Patient Position: Sitting, Cuff Size: Adult Regular)   Pulse 73   Resp 15   Ht 1.778 m (5' 10\")   Wt 68.5 kg (151 lb)   BMI 21.67 kg/m      Kelly Collins CMA    "

## 2019-08-27 NOTE — PROGRESS NOTES
Otolaryngology Clinic      Name: Paulina Villatoro  MRN: [id number]  Age: 28 year old  : 1990  Referring provider: Referred Self  2019      Chief Complaint:  Consult       History of Present Illness:   Paulina Villatoro is a 28 year old female who presents for evaluation of a lesion on the frenulum of her tongue.  The patient reports noticing a lump on the base of her mouth connected to the frenulum of her tongue about 2 weeks ago.  There was no sore or ulcer in this area.  She does not recall any specific trauma.  Area does continue to enlarge and is uncomfortable but not really painful.     Active Medications:     Current Outpatient Medications:      albuterol (PROVENTIL HFA: VENTOLIN HFA) 108 (90 BASE) MCG/ACT inhaler, Inhale 2 puffs into the lungs every 6 hours., Disp: , Rfl:      cephALEXin (KEFLEX) 500 MG capsule, Take 1 capsule (500 mg) by mouth 2 times daily for 10 days, Disp: 20 capsule, Rfl: 0     citalopram (CELEXA) 20 MG tablet, Take 30 mg by mouth daily, Disp: , Rfl:      IBUPROFEN 200 MG OR TABS, TAKE 1 TO 2 TABLETS EVERY 4 TO 6 HOURS AS NEEDED WITH FOOD, Disp: 140, Rfl: 0     TYLENOL EXTRA STRENGTH 500 MG OR TABS, ONE TABLET EVERY 4 TO 6 HOURS AS NEEDED FOR PAIN, MAXIMUM OF 8 PER DAY, Disp: 60, Rfl: 0      Allergies:   No known drug allergies.       Past Medical History:  Lumbar disc herniation  Anxiety disorder   Mild major depressive disorder   Mild intermittent asthma   Irritable bowel syndrome      Past Surgical History:  History reviewed. No pertinent past surgical history.     Family History:   Diabetes - paternal grandmother      Social History:   Presents to clinic alone.  Tobacco Use: No previous or current tobacco use.   Alcohol Use: No alcohol use.   PCP: Tawana Azar     Review of Systems:   Pertinent items are noted in HPI or as in patient entered ROS below, remainder of complete ROS is negative.       Physical Exam:   /65 (BP Location: Right arm, Patient  "Position: Sitting, Cuff Size: Adult Regular)   Pulse 73   Resp 15   Ht 1.778 m (5' 10\")   Wt 68.5 kg (151 lb)   BMI 21.67 kg/m       Constitutional:  The patient was unaccompanied, well-groomed, and in no acute distress.    Skin:  Warm and pink.    Neurologic:  Alert and oriented x 3.  CN's III-XII within normal limits.  Voice normal.   Psychiatric:  The patient's affect was calm, cooperative, and appropriate.    Respiratory:  Breathing comfortably without stridor or exertion of accessory muscles.    Eyes: Extraocular movement intact.    Head:  Normocephalic and atraumatic.  No lesions or scars.    Ears:  Pinnae and tragus non-tender.  EAC's and TM's were clear.     Nose:  Sinuses were non-tender.  Anterior rhinoscopy revealed midline septum and absence of purulence or polyps.    OC/OP:  Normal tongue, buccal mucosa, and palate. Enlarged ostial area of left submandibular duct.  No lesions or masses on inspection or palpation.  No abnormal lymph tissue in the oropharynx.  The pterygoid region is non-tender.    Neck:  Supple with normal laryngeal and tracheal landmarks.  The parotid beds were without masses.  No palpable thyroid.  Lymphatic:  There is no palpable lymphadenopathy in the neck.      Assessment and Plan:  Accessory oral frenulum  Small area of growth on floor of the mouth adjacent to the frenulum, possibly a granuloma or area of infection.  Will start her on a 10-day course of Keflex and see her back in a few weeks.  If this has not improved, I may remove it at that time.  - cephALEXin (KEFLEX) 500 MG capsule  Dispense: 20 capsule; Refill: 0       Follow-up: in 2 - 3 weeks        Scribe Disclosure:  I, Joanna Brown, am serving as a scribe to document services personally performed by Sam Chacon MD at this visit, based upon the provider's statements to me. All documentation has been reviewed by the aforementioned provider prior to being entered into the official medical record.  "

## 2019-08-27 NOTE — PATIENT INSTRUCTIONS
1. You were seen in the ENT Clinic today by Dr. Chacon.  If you have any questions or concerns after your appointment, please call   - Option 1: ENT Clinic: [phone]  - Option 2: Alejandrina (Dr. Chacon's Nurse): [phone]    2.   Plan to return to clinic in 2 weeks following antibiotics; if persists return for possible biopsy     Natice Schwab, RN  [facility] Otolaryng[facility]  [phone]

## 2019-08-27 NOTE — LETTER
2019       RE: Paulina Villatoro  [address] [facility] [address]     Dear Colleague,    Thank you for referring your patient, Paulina Villatoro, to the [facility]. Please see a copy of my visit note below.    Otolaryngology Clinic      Name: Paulina Villatoro  MRN: [id number]  Age: 28 year old  : 1990  Referring provider: Referred Self  2019      Chief Complaint:  Consult       History of Present Illness:   Paulina Villatoro is a 28 year old female who presents for evaluation of a lesion on the frenulum of her tongue.  The patient reports noticing a lump on the base of her mouth connected to the frenulum of her tongue about 2 weeks ago.  There was no sore or ulcer in this area.  She does not recall any specific trauma.  Area does continue to enlarge and is uncomfortable but not really painful.     Active Medications:     Current Outpatient Medications:      albuterol (PROVENTIL HFA: VENTOLIN HFA) 108 (90 BASE) MCG/ACT inhaler, Inhale 2 puffs into the lungs every 6 hours., Disp: , Rfl:      cephALEXin (KEFLEX) 500 MG capsule, Take 1 capsule (500 mg) by mouth 2 times daily for 10 days, Disp: 20 capsule, Rfl: 0     citalopram (CELEXA) 20 MG tablet, Take 30 mg by mouth daily, Disp: , Rfl:      IBUPROFEN 200 MG OR TABS, TAKE 1 TO 2 TABLETS EVERY 4 TO 6 HOURS AS NEEDED WITH FOOD, Disp: 140, Rfl: 0     TYLENOL EXTRA STRENGTH 500 MG OR TABS, ONE TABLET EVERY 4 TO 6 HOURS AS NEEDED FOR PAIN, MAXIMUM OF 8 PER DAY, Disp: 60, Rfl: 0      Allergies:   No known drug allergies.       Past Medical History:  Lumbar disc herniation  Anxiety disorder   Mild major depressive disorder   Mild intermittent asthma   Irritable bowel syndrome      Past Surgical History:  History reviewed. No pertinent past surgical history.     Family History:   Diabetes - paternal grandmother      Social History:   Presents to clinic alone.  Tobacco Use: No previous or  "current tobacco use.   Alcohol Use: No alcohol use.   PCP: Tawana Azar     Review of Systems:   Pertinent items are noted in HPI or as in patient entered ROS below, remainder of complete ROS is negative.       Physical Exam:   /65 (BP Location: Right arm, Patient Position: Sitting, Cuff Size: Adult Regular)   Pulse 73   Resp 15   Ht 1.778 m (5' 10\")   Wt 68.5 kg (151 lb)   BMI 21.67 kg/m        Constitutional:  The patient was unaccompanied, well-groomed, and in no acute distress.    Skin:  Warm and pink.    Neurologic:  Alert and oriented x 3.  CN's III-XII within normal limits.  Voice normal.   Psychiatric:  The patient's affect was calm, cooperative, and appropriate.    Respiratory:  Breathing comfortably without stridor or exertion of accessory muscles.    Eyes: Extraocular movement intact.    Head:  Normocephalic and atraumatic.  No lesions or scars.    Ears:  Pinnae and tragus non-tender.  EAC's and TM's were clear.     Nose:  Sinuses were non-tender.  Anterior rhinoscopy revealed midline septum and absence of purulence or polyps.    OC/OP:  Normal tongue, buccal mucosa, and palate. Enlarged ostial area of left submandibular duct.  No lesions or masses on inspection or palpation.  No abnormal lymph tissue in the oropharynx.  The pterygoid region is non-tender.    Neck:  Supple with normal laryngeal and tracheal landmarks.  The parotid beds were without masses.  No palpable thyroid.  Lymphatic:  There is no palpable lymphadenopathy in the neck.      Assessment and Plan:  Accessory oral frenulum  Small area of growth on floor of the mouth adjacent to the frenulum, possibly a granuloma or area of infection.  Will start her on a 10-day course of Keflex and see her back in a few weeks.  If this has not improved, I may remove it at that time.  - cephALEXin (KEFLEX) 500 MG capsule  Dispense: 20 capsule; Refill: 0       Follow-up: in 2 - 3 weeks        Scribe Disclosure:  ALLY, Joanna Brown, am " serving as a scribe to document services personally performed by Sam Chacon MD at this visit, based upon the provider's statements to me. All documentation has been reviewed by the aforementioned provider prior to being entered into the official medical record.    Again, thank you for allowing me to participate in the care of your patient.      Sincerely,    Sam Chacon MD

## 2019-12-20 ENCOUNTER — OFFICE VISIT (OUTPATIENT)
Dept: LAB | Facility: SCHOOL | Age: 29
End: 2019-12-20
Payer: COMMERCIAL

## 2019-12-20 VITALS
OXYGEN SATURATION: 98 % | SYSTOLIC BLOOD PRESSURE: 98 MMHG | RESPIRATION RATE: 16 BRPM | WEIGHT: 150 LBS | DIASTOLIC BLOOD PRESSURE: 62 MMHG | TEMPERATURE: 97.9 F | HEART RATE: 56 BPM | BODY MASS INDEX: 21.47 KG/M2 | HEIGHT: 70 IN

## 2019-12-20 DIAGNOSIS — Z00.00 WELLNESS EXAMINATION: ICD-10-CM

## 2019-12-20 DIAGNOSIS — J01.90 ACUTE SINUSITIS WITH SYMPTOMS > 10 DAYS: Primary | ICD-10-CM

## 2019-12-20 PROCEDURE — 99213 OFFICE O/P EST LOW 20 MIN: CPT

## 2019-12-20 RX ORDER — AMOXICILLIN AND CLAVULANATE POTASSIUM 600; 42.9 MG/5ML; MG/5ML
875 POWDER, FOR SUSPENSION ORAL 2 TIMES DAILY
Qty: 146 ML | Refills: 0 | Status: SHIPPED | OUTPATIENT
Start: 2019-12-20 | End: 2019-12-30

## 2019-12-20 ASSESSMENT — PAIN SCALES - GENERAL: PAINLEVEL: NO PAIN (0)

## 2019-12-20 ASSESSMENT — MIFFLIN-ST. JEOR: SCORE: 1475.65

## 2019-12-20 NOTE — PROGRESS NOTES
"SUBJECTIVE:  CC: Paulina Villatoro is an 29 year old woman who presents for Wellness Check at [id number].     Review of Healthy Lifestyle:    Do you get at least three servings of calcium containing foods daily (dairy, green leafy vegetables, etc.)? yes     Do you have a high-fiber diet? yes     Amount of exercise or daily activities, outside of work: 3-4 day(s) per week    Do you wear sunscreen on a regular basis? Yes 30-50     Are you taking your medications regularly No    Have you had an eye exam in the past two years? yes    Do you see a dentist twice per year? no    Do you have sleep apnea, excessive snoring or excessive daytime drowsiness? no    Do you use tobacco in any form? no       OBJECTIVE:    Vitals: BP 98/62 (BP Location: Right arm, Patient Position: Sitting, Cuff Size: Adult Regular)   Pulse 56   Temp 97.9  F (36.6  C) (Tympanic)   Resp 16   Ht 1.778 m (5' 10\")   Wt 68 kg (150 lb)   LMP 12/11/2019 (Approximate)   SpO2 98%   Breastfeeding No   BMI 21.52 kg/m    BMI= Body mass index is 21.52 kg/m .    HEARING: Right Ear:        500Hz:  RESPONSE- on Level:   20 db    1000 Hz: RESPONSE- on Level:   20 db    2000 Hz: RESPONSE- on Level:   20 db    4000 Hz: RESPONSE- on Level:   20 db     Left Ear:       500Hz:  RESPONSE- on Level:   20 db    1000 Hz: RESPONSE- on Level:   20 db    2000 Hz: RESPONSE- on Level:   20 db    4000 Hz: RESPONSE- on Level:   20 db         Medication Reconciliation: complete    ASSESSMENT/PLAN: See PCP annually as already doing.    COUNSELING:      reports that she has never smoked. She has never used smokeless tobacco.    Estimated body mass index is 21.52 kg/m  as calculated from the following:    Height as of this encounter: 1.778 m (5' 10\").    Weight as of this encounter: 68 kg (150 lb).       Counseling Resources  [employer]  [email or web address].[email or web address]/    [place] [employer] PROVIDER  [facility]   Subjective     Paulina " "CHICHO Villatoro is a 29 year old female who presents to clinic today for the following health issues:    HPI   ENT Symptoms             Symptoms: cc Present Absent Comment   Fever/Chills   x    Fatigue  x     Muscle Aches  x     Eye Irritation   x    Sneezing  x     Nasal Zhou/Drg  x     Sinus Pressure/Pain  x     Loss of smell   x    Dental pain   x    Sore Throat   x    Swollen Glands   x    Ear Pain/Fullness  x  Ears are popping    Cough   x    Wheeze   x    Chest Pain   x    Shortness of breath   x    Rash   x    Other         Symptom duration:  10 days   Symptom severity:  moderate    Treatments tried:  none   Contacts:  unknown     Reports that yesterday she had an episode of purulent fluid \"leaking from nose\". No headache or dizziness, no recent facial trauma    Patient Active Problem List   Diagnosis     LUMBAR DISC HERNIATION L5-S1     CARDIOVASCULAR SCREENING; LDL GOAL LESS THAN 160     Adjustment disorder with anxiety     Anxiety disorder, unspecified     Chronic low back pain     IBS (irritable bowel syndrome)     Mild intermittent asthma without complication     Mild recurrent major depression (H)     History reviewed. No pertinent surgical history.    Social History     Tobacco Use     Smoking status: Never Smoker     Smokeless tobacco: Never Used   Substance Use Topics     Alcohol use: No     Family History   Problem Relation Age of Onset     Diabetes Paternal Grandmother          Current Outpatient Medications   Medication Sig Dispense Refill     albuterol (PROVENTIL HFA: VENTOLIN HFA) 108 (90 BASE) MCG/ACT inhaler Inhale 2 puffs into the lungs every 6 hours.       amoxicillin-clavulanate (AUGMENTIN-ES) 600-42.9 MG/5ML suspension Take 7.3 mLs (875 mg) by mouth 2 times daily for 10 days 146 mL 0     citalopram (CELEXA) 20 MG tablet Take 30 mg by mouth daily       IBUPROFEN 200 MG OR TABS TAKE 1 TO 2 TABLETS EVERY 4 TO 6 HOURS AS NEEDED WITH FOOD 140 0     TYLENOL EXTRA STRENGTH 500 MG OR TABS ONE TABLET " "EVERY 4 TO 6 HOURS AS NEEDED FOR PAIN, MAXIMUM OF 8 PER DAY 60 0         Reviewed and updated as needed this visit by Provider  Tobacco  Allergies  Meds  Problems  Med Hx  Surg Hx  Fam Hx         Review of Systems   ROS COMP: Constitutional, HEENT, cardiovascular, pulmonary, gi and gu systems are negative, except as otherwise noted.      Objective    BP 98/62 (BP Location: Right arm, Patient Position: Sitting, Cuff Size: Adult Regular)   Pulse 56   Temp 97.9  F (36.6  C) (Tympanic)   Resp 16   Ht 1.778 m (5' 10\")   Wt 68 kg (150 lb)   LMP 12/11/2019 (Approximate)   SpO2 98%   Breastfeeding No   BMI 21.52 kg/m    Body mass index is 21.52 kg/m .  Physical Exam  Vitals signs and nursing note reviewed.   Constitutional:       Appearance: Normal appearance.   HENT:      Head: Normocephalic and atraumatic.      Right Ear: Tympanic membrane and ear canal normal.      Left Ear: Tympanic membrane and ear canal normal.      Nose: No rhinorrhea.      Right Sinus: Maxillary sinus tenderness present. No frontal sinus tenderness.      Left Sinus: Maxillary sinus tenderness present. No frontal sinus tenderness.      Mouth/Throat:      Lips: Pink.      Mouth: Mucous membranes are moist.      Pharynx: Oropharynx is clear.   Eyes:      General: Lids are normal.      Conjunctiva/sclera: Conjunctivae normal.      Comments: Non icteric   Neck:      Musculoskeletal: Neck supple.   Cardiovascular:      Rate and Rhythm: Normal rate and regular rhythm.      Pulses: Normal pulses.      Heart sounds: Normal heart sounds, S1 normal and S2 normal.   Pulmonary:      Effort: Pulmonary effort is normal.      Breath sounds: Normal breath sounds and air entry.   Lymphadenopathy:      Cervical: No cervical adenopathy.   Skin:     General: Skin is warm and dry.   Neurological:      General: No focal deficit present.      Mental Status: She is alert and oriented to person, place, and time.   Psychiatric:         Mood and Affect: Mood " normal.         Behavior: Behavior normal.         Thought Content: Thought content normal.         Judgment: Judgment normal.          Diagnostic Test Results:  Labs reviewed in Epic  none         Assessment & Plan     1. Acute sinusitis with symptoms > 10 days  Prefers liquid medication.  - amoxicillin-clavulanate (AUGMENTIN-ES) 600-42.9 MG/5ML suspension; Take 7.3 mLs (875 mg) by mouth 2 times daily for 10 days  Dispense: 146 mL; Refill: 0    2. Wellness examination           See Patient Instructions    No follow-ups on file.  Melissa Cardenas, [facility] PROVIDER  [facility]

## 2019-12-20 NOTE — PATIENT INSTRUCTIONS
Thank you for using the [facility].  If there is no improvement of your condition, please call and schedule an appointment with your primary care provider.                  Paulina Villatoro has completed a Wellness Check at the [facility] on 12/20/2019.      ____________________________________________  [employer] PROVIDER                                                                               Wellness Visit Recommendations:      See your regular primary care health provider every year in order to help stay healthy.    Review health changes.     Review your medicines if your doctor has prescribed any.    Talk to your provider about how often to have your cholesterol checked.    If you are at risk for diabetes, you should have a diabetes test (fasting glucose).    Shots: Get a flu shot each year. Get a tetanus shot every 10 years.     Review with your primary care provider other immunizations that you may need based on your age and/or medical/surgical history    Nutrition:     Eat at least 5 servings of fruits and vegetables each day.    Eat whole-grain bread, whole-wheat pasta and brown rice instead of white grains and rice.    Preventive Care to be reviewed by your primary care provider:    Females:        Cervical Cancer Screening                          Breast Cancer Screening                          Colon Cancer Screening  Males:             Prostrate Cancer Screening                          Colon Cancer Screening      Lifestyle:    Exercise at least 150 minutes a week (30 minutes a day, 5 days of the week). This will help you control your weight and help prevent disease or manage disease.    Limit alcohol to one drink per day or less depending on your past medical history.    No smoking.     Wear sunscreen to prevent skin cancer.    See your dentist every six months for an exam and cleaning.    Today's Vital Signs:  BP 98/62 (BP Location: Right arm,  "Patient Position: Sitting, Cuff Size: Adult Regular)   Pulse 56   Temp 97.9  F (36.6  C) (Tympanic)   Resp 16   Ht 1.778 m (5' 10\")   Wt 68 kg (150 lb)   LMP 12/11/2019 (Approximate)   SpO2 98%   Breastfeeding No   BMI 21.52 kg/m                  Paulina Villatoro has completed a Wellness Check at the [facility] on 12/20/2019.      ____________________________________________  [employer] PROVIDER                                                                               Wellness Visit Recommendations:      See your regular primary care health provider every year in order to help stay healthy.    Review health changes.     Review your medicines if your doctor has prescribed any.    Talk to your provider about how often to have your cholesterol checked.    If you are at risk for diabetes, you should have a diabetes test (fasting glucose).    Shots: Get a flu shot each year. Get a tetanus shot every 10 years.     Review with your primary care provider other immunizations that you may need based on your age and/or medical/surgical history    Nutrition:     Eat at least 5 servings of fruits and vegetables each day.    Eat whole-grain bread, whole-wheat pasta and brown rice instead of white grains and rice.    Preventive Care to be reviewed by your primary care provider:    Females:        Cervical Cancer Screening                          Breast Cancer Screening                          Colon Cancer Screening  Males:             Prostrate Cancer Screening                          Colon Cancer Screening      Lifestyle:    Exercise at least 150 minutes a week (30 minutes a day, 5 days of the week). This will help you control your weight and help prevent disease or manage disease.    Limit alcohol to one drink per day or less depending on your past medical history.    No smoking.     Wear sunscreen to prevent skin cancer.    See your dentist every six months for an exam and " "cleaning.    Today's Vital Signs:  BP 98/62 (BP Location: Right arm, Patient Position: Sitting, Cuff Size: Adult Regular)   Pulse 56   Temp 97.9  F (36.6  C) (Tympanic)   Resp 16   Ht 1.778 m (5' 10\")   Wt 68 kg (150 lb)   LMP 12/11/2019 (Approximate)   SpO2 98%   Breastfeeding No   BMI 21.52 kg/m     "